# Patient Record
Sex: FEMALE | Race: WHITE | NOT HISPANIC OR LATINO
[De-identification: names, ages, dates, MRNs, and addresses within clinical notes are randomized per-mention and may not be internally consistent; named-entity substitution may affect disease eponyms.]

---

## 2022-07-21 PROBLEM — Z00.00 ENCOUNTER FOR PREVENTIVE HEALTH EXAMINATION: Status: ACTIVE | Noted: 2022-07-21

## 2022-07-27 ENCOUNTER — APPOINTMENT (OUTPATIENT)
Dept: VASCULAR SURGERY | Facility: CLINIC | Age: 27
End: 2022-07-27

## 2022-07-27 VITALS
BODY MASS INDEX: 28.04 KG/M2 | HEIGHT: 68 IN | DIASTOLIC BLOOD PRESSURE: 69 MMHG | HEART RATE: 62 BPM | SYSTOLIC BLOOD PRESSURE: 104 MMHG | WEIGHT: 185 LBS

## 2022-07-27 DIAGNOSIS — I87.2 VENOUS INSUFFICIENCY (CHRONIC) (PERIPHERAL): ICD-10-CM

## 2022-07-27 DIAGNOSIS — I83.93 ASYMPTOMATIC VARICOSE VEINS OF BILATERAL LOWER EXTREMITIES: ICD-10-CM

## 2022-07-27 DIAGNOSIS — I83.893 VARICOSE VEINS OF BILATERAL LOWER EXTREMITIES WITH OTHER COMPLICATIONS: ICD-10-CM

## 2022-07-27 PROCEDURE — 93970 EXTREMITY STUDY: CPT

## 2022-07-27 PROCEDURE — 99203 OFFICE O/P NEW LOW 30 MIN: CPT

## 2022-07-27 RX ORDER — SERTRALINE HYDROCHLORIDE 50 MG/1
50 TABLET, FILM COATED ORAL
Refills: 0 | Status: ACTIVE | COMMUNITY

## 2022-08-01 NOTE — PHYSICAL EXAM
[Respiratory Effort] : normal respiratory effort [Normal Rate and Rhythm] : normal rate and rhythm [2+] : left 2+ [No Rash or Lesion] : No rash or lesion [Alert] : alert [Oriented to Person] : oriented to person [Oriented to Place] : oriented to place [Oriented to Time] : oriented to time [Calm] : calm [Ankle Swelling (On Exam)] : present [Ankle Swelling On The Right] : mild [Varicose Veins Of Lower Extremities] : bilaterally [Ankle Swelling Bilaterally] : severe [de-identified] : WN/WD [] : not present [FreeTextEntry1] : Bilt LEs with mild edema, nonpitting w/ large, bulging veins down the medial aspect of the distal thigh to the calf, worse on the right side. No signs of phlebitis. Spider veins on both ankles.  [de-identified] : FROM

## 2022-08-01 NOTE — CONSULT LETTER
[Dear  ___] : Dear  [unfilled], [FreeTextEntry2] : Robin Faust, DO\par [address] [facility]\[place] [address] [FreeTextEntry1] : I had the pleasure of seeing Ms Cheikh Powell in my office for evaluation of varicose veins. She reports long history of varicose veins that swell and become larger, especially on the right leg and with each pregnancy.  He has had 6 children.  Plans on future pregnancies.  She also complains of varices in the vaginal area.  Given how symptomatic the veins were during her last pregnancy she wishes to consider treatment now.  Denies any history of bleeding or clotting disorders, vein procedures or wounds. \par \par On exam, she has large bulging venous varices veins from medial thighs down to calves, larger and more prominent on the right side. Mild leg edema with spider veins bilaterally. Skin intact.  \par \par Venous duplex scan also demonstrates evidence of bilateral greater saphenous vein reflux. No evidence of deep or superficial vein thrombosis. \par \par Ms Quesada has venous reflux and symptomatic varicose veins in the legs and vaginal area. I recommend to first obtain a CT venogram of the abdomen and pelvis to rule out any pelvic congestion syndrome.  I do believe she will benefit with ablation of the incompetent greater saphenous veins.  She might need subsequent stab phlebectomy and possible closure of pelvic incompetent veins. I will keep you posted. \par \par My complete EMR office note is below for your records. If you have any questions please do not hesitate to contact me.  [FreeTextEntry3] : Sincerely, \par \par Akshat Donald M.D. \par , [facility]\par , Department of Surgery [facility] [place]\par Professor of Surgery, [employer] at [facility]

## 2022-08-01 NOTE — ADDENDUM
[FreeTextEntry1] : I, Dr. Akshat Donald, personally performed the evaluation and management (E/M) services for this new patient.  That E/M includes conducting the initial examination, assessing all conditions, and establishing the plan of care.  Today, my ACP, Padmini Yañez NP, was here to observe my evaluation and management services for this patient to be followed going forward. \par \par The documentation for this encounter was entered by Arash Varela acting as a scribe for Dr.Gary Donald.\par

## 2022-08-01 NOTE — ASSESSMENT
[Arterial/Venous Disease] : arterial/venous disease [FreeTextEntry1] : 26 y/o F w/ bilateral GSV reflux, symptomatic vaginal and lower extremity varicose veins which have been progressively worsening over the years. \par \par On exam, palpable peripheral pulses. Bilt LEs with mild edema, nonpitting w/ large, bulging veins down the medial aspect of the distal thigh to the calf, worse on the right side. No signs of phlebitis. Spider veins on both ankles. \par \par Venous duplex showed no evidence of superficial or deep thrombosis. Bilateral GSVs with positive reflux. Also large varices.\par \par At this time, I recommend pt to first start with a CT venogram of the abdomen and pelvis to r/o any pelvic congestion before any vascular procedure. Once CT completed and reviewed, the recommendation will likely be for her to have RFA of both LEs to treat the incompetent GSV (R followed by L). Risk, complications and alternatives were discussed, all questions were answered. Patient would like to proceed with proposed plan. Patient recommended to wear the compression stockings daily and new prescription provided 20-30mmHg. She might need stab phlebectomy after RFAs and possible intervention in pelvic veins pending CT results. \par

## 2022-08-01 NOTE — HISTORY OF PRESENT ILLNESS
[FreeTextEntry1] : 28 y/o F referred by a [facility] w/ no significant  PMHx. She presents with long-standing hx of varicose veins, worse on the right leg and in the vaginal area. She explains with each pregnancy, the veins become worse, large, more swollen and very painful. During her last pregnancy, she was unable to stand given the degree of the symptoms in her legs and she was mainly in bedrest. Her legs are constantly with some discomfort and the veins burn and itch. She wears compression stockings occasionally but not all the time given she does not see much symptom relieve from them and during very hot days she cannot tolerate them. \par Patient has 6 children and plans to have a few more pregnancies however, the pain, veins and leg swelling is becoming worse. She adds to have a few varices on the vaginal area and would like to know if these can also be treated as with each pregnancy these also become worse. Denies any hx of phlebitis or blood clots during pregnancy, wounds, vein procedures.\par \par \par \par FHx:\par - mother and grandmother w/ hx of v.v \par \par SHx:\par - Never smoker\par

## 2022-08-01 NOTE — PROCEDURE
[FreeTextEntry1] : Venous duplex ordered to r/o insuff and eval vv size, shows: negative DVT/SVT bilaterally. Bilateral GSV reflux noted. Large varices bilt measuring up to 4.0mm.

## 2022-08-09 ENCOUNTER — RESULT REVIEW (OUTPATIENT)
Age: 27
End: 2022-08-09

## 2022-10-25 NOTE — PROCEDURE
[FreeTextEntry3] : Surgeon: Akshat Donald MD\par \par Assistant: Padmini Mcgregor RVT\par \par Pre-Op Diagnosis:  Incompetent RIGHT Greater Saphenous Vein\par \par Post-Op Diagnosis:  Same\par \par Procedure:  Transcatheter Occlusion of RIGHT Greater Saphenous Vein using a Radio- Frequency Thermal Ablation (VNUS) ClosureFAST Catheter.\par \par Anesthesia: Local \par \par History: Symptomatic varicose veins\par \par Findings:  Complete occlusion of greater saphenous vein at end of procedure.\par \par Procedure: The patient’s leg was prepped and draped.  Under local 1% Lidocaine the greater saphenous vein was punctured below the knee with a micropuncture needle and then the guidewire was inserted into the vein.  This was performed under ultrasound guidance.  The skin was incised with a #11 Blade, a dilator was inserted and then the 7 Fr. Introducer was placed into the greater saphenous vein.  \par \par The fossa ovalis was visualized with the Duplex scan.  The common femoral vein was confirmed to be patent.  Duplex examination of the greater saphenous vein from the calf to the groin was performed.  The greater saphenous and inferior epigastric vein were identified and the VNUS catheter was introduced through the introducer and into the vein up to the fossa ovalis.  It was positioned 3 cm distal to the inferior epigastric vein.\par \par Tumescent solution (400 cc normal saline, 4cc of 8.4% Sodium bicarbonate and 40 cc 1% Lidoaine) was infiltrated subfascially over the vein.  \par \par The VNUS ClosureFAST catheter checked for proper function.  Thermal ablation was begun after the position of the catheter was once again confirmed to be 3 cm distal to the inferior epigastric branch of the greater saphenous vein. The proximal 7 cm was treated twice and then the rest of the vein was treated with single 20 sec cycles. The sheath and catheter were removed. Compression was applied for hemostasis.    \par \par Confirmation of common femoral vein patency and occlusion of the greater saphenous vein was made with duplex ultrasonography.  \par \par The leg was wrapped with gauze and Ace Bandages.  The patient remained on the table until alert and was then comfortable ambulating.           \par \par Patient will return in 2-3 days for repeat ultrasound to make sure GSV is closed and to check for heat induced thrombus.\par

## 2022-10-26 ENCOUNTER — APPOINTMENT (OUTPATIENT)
Dept: VASCULAR SURGERY | Facility: CLINIC | Age: 27
End: 2022-10-26

## 2022-11-30 ENCOUNTER — APPOINTMENT (OUTPATIENT)
Dept: VASCULAR SURGERY | Facility: CLINIC | Age: 27
End: 2022-11-30